# Patient Record
Sex: FEMALE | Race: ASIAN | Employment: FULL TIME | URBAN - METROPOLITAN AREA
[De-identification: names, ages, dates, MRNs, and addresses within clinical notes are randomized per-mention and may not be internally consistent; named-entity substitution may affect disease eponyms.]

---

## 2018-02-21 ENCOUNTER — OFFICE VISIT (OUTPATIENT)
Dept: INTERNAL MEDICINE CLINIC | Facility: CLINIC | Age: 48
End: 2018-02-21

## 2018-02-21 VITALS
RESPIRATION RATE: 17 BRPM | TEMPERATURE: 98 F | BODY MASS INDEX: 21.6 KG/M2 | SYSTOLIC BLOOD PRESSURE: 110 MMHG | OXYGEN SATURATION: 99 % | WEIGHT: 110 LBS | HEART RATE: 64 BPM | HEIGHT: 60 IN | DIASTOLIC BLOOD PRESSURE: 70 MMHG

## 2018-02-21 DIAGNOSIS — L40.9 PSORIASIS: Primary | ICD-10-CM

## 2018-02-21 DIAGNOSIS — Z00.00 GENERAL MEDICAL EXAM: ICD-10-CM

## 2018-02-21 LAB
ALBUMIN SERPL BCP-MCNC: 4 G/DL (ref 3.5–4.8)
ALBUMIN/GLOB SERPL: 1.4 {RATIO} (ref 1–2)
ALP SERPL-CCNC: 65 U/L (ref 32–100)
ALT SERPL-CCNC: 12 U/L (ref 14–54)
ANION GAP SERPL CALC-SCNC: 5 MMOL/L (ref 0–18)
AST SERPL-CCNC: 19 U/L (ref 15–41)
BASOPHILS # BLD: 0 K/UL (ref 0–0.2)
BASOPHILS NFR BLD: 1 %
BILIRUB SERPL-MCNC: 0.9 MG/DL (ref 0.3–1.2)
BUN SERPL-MCNC: 10 MG/DL (ref 8–20)
BUN/CREAT SERPL: 17.2 (ref 10–20)
CALCIUM SERPL-MCNC: 9.2 MG/DL (ref 8.5–10.5)
CHLORIDE SERPL-SCNC: 108 MMOL/L (ref 95–110)
CHOLEST SERPL-MCNC: 218 MG/DL (ref 110–200)
CO2 SERPL-SCNC: 28 MMOL/L (ref 22–32)
CREAT SERPL-MCNC: 0.58 MG/DL (ref 0.5–1.5)
EOSINOPHIL # BLD: 0.2 K/UL (ref 0–0.7)
EOSINOPHIL NFR BLD: 5 %
ERYTHROCYTE [DISTWIDTH] IN BLOOD BY AUTOMATED COUNT: 12.9 % (ref 11–15)
ERYTHROCYTE [SEDIMENTATION RATE] IN BLOOD: 12 MM/HR (ref 0–20)
GLOBULIN PLAS-MCNC: 2.9 G/DL (ref 2.5–3.7)
GLUCOSE SERPL-MCNC: 93 MG/DL (ref 70–99)
HCT VFR BLD AUTO: 41.2 % (ref 35–48)
HDLC SERPL-MCNC: 58 MG/DL
HGB BLD-MCNC: 13.5 G/DL (ref 12–16)
LDLC SERPL CALC-MCNC: 142 MG/DL (ref 0–99)
LYMPHOCYTES # BLD: 1.4 K/UL (ref 1–4)
LYMPHOCYTES NFR BLD: 32 %
MCH RBC QN AUTO: 30.1 PG (ref 27–32)
MCHC RBC AUTO-ENTMCNC: 32.7 G/DL (ref 32–37)
MCV RBC AUTO: 92 FL (ref 80–100)
MONOCYTES # BLD: 0.4 K/UL (ref 0–1)
MONOCYTES NFR BLD: 9 %
NEUTROPHILS # BLD AUTO: 2.4 K/UL (ref 1.8–7.7)
NEUTROPHILS NFR BLD: 53 %
NONHDLC SERPL-MCNC: 160 MG/DL
OSMOLALITY UR CALC.SUM OF ELEC: 291 MOSM/KG (ref 275–295)
PATIENT FASTING: YES
PLATELET # BLD AUTO: 198 K/UL (ref 140–400)
PMV BLD AUTO: 8.9 FL (ref 7.4–10.3)
POTASSIUM SERPL-SCNC: 4.9 MMOL/L (ref 3.3–5.1)
PROT SERPL-MCNC: 6.9 G/DL (ref 5.9–8.4)
RBC # BLD AUTO: 4.48 M/UL (ref 3.7–5.4)
SODIUM SERPL-SCNC: 141 MMOL/L (ref 136–144)
TRIGL SERPL-MCNC: 90 MG/DL (ref 1–149)
WBC # BLD AUTO: 4.5 K/UL (ref 4–11)

## 2018-02-21 PROCEDURE — 86235 NUCLEAR ANTIGEN ANTIBODY: CPT | Performed by: INTERNAL MEDICINE

## 2018-02-21 PROCEDURE — 36415 COLL VENOUS BLD VENIPUNCTURE: CPT | Performed by: INTERNAL MEDICINE

## 2018-02-21 PROCEDURE — 86039 ANTINUCLEAR ANTIBODIES (ANA): CPT | Performed by: INTERNAL MEDICINE

## 2018-02-21 PROCEDURE — 86200 CCP ANTIBODY: CPT | Performed by: INTERNAL MEDICINE

## 2018-02-21 PROCEDURE — 80053 COMPREHEN METABOLIC PANEL: CPT | Performed by: INTERNAL MEDICINE

## 2018-02-21 PROCEDURE — 86038 ANTINUCLEAR ANTIBODIES: CPT | Performed by: INTERNAL MEDICINE

## 2018-02-21 PROCEDURE — 80061 LIPID PANEL: CPT | Performed by: INTERNAL MEDICINE

## 2018-02-21 PROCEDURE — 85652 RBC SED RATE AUTOMATED: CPT | Performed by: INTERNAL MEDICINE

## 2018-02-21 PROCEDURE — 86225 DNA ANTIBODY NATIVE: CPT | Performed by: INTERNAL MEDICINE

## 2018-02-21 PROCEDURE — 85025 COMPLETE CBC W/AUTO DIFF WBC: CPT | Performed by: INTERNAL MEDICINE

## 2018-02-21 PROCEDURE — 99213 OFFICE O/P EST LOW 20 MIN: CPT | Performed by: INTERNAL MEDICINE

## 2018-02-21 NOTE — PROGRESS NOTES
HPI:    Patient ID: Song French is a 49year old female. HPI  C/o body itch with skin lesion for 3 to 4 yrs. Lived in [address] [place] [address],[address] for 10 yrs. Originally from [facility].  On and off macular rashes along her torso,  Takes cetrizine with relief.   Denies arthralgia confirmation  Continue cetrixzine  Prn. Avoid hot bath  Use unscented misturizer. Orders Placed This Encounter      Lipid Panel [E]      Comp Metabolic Panel (14) [E]      BRE, Direct, Reflex Titer + Spec AB [E]      Cyclic Citrullinate Pep.  IGG [E

## 2018-02-22 LAB — NUCLEAR IGG TITR SER IF: POSITIVE {TITER}

## 2018-02-23 LAB
ANA NUCLEOLAR TITR SER IF: 80 {TITER}
CCP IGG SERPL-ACNC: 0.5 U/ML (ref 0–6.9)
DSDNA AB TITR SER: <10 {TITER}

## 2018-02-27 LAB
ENA SM IGG SER QL: NEGATIVE
ENA SM+RNP AB SER QL: NEGATIVE
ENA SS-A AB SER QL IA: NEGATIVE
ENA SS-B AB SER QL IA: NEGATIVE

## 2018-03-14 ENCOUNTER — OFFICE VISIT (OUTPATIENT)
Dept: INTERNAL MEDICINE CLINIC | Facility: CLINIC | Age: 48
End: 2018-03-14

## 2018-03-14 VITALS
HEART RATE: 83 BPM | DIASTOLIC BLOOD PRESSURE: 70 MMHG | RESPIRATION RATE: 16 BRPM | OXYGEN SATURATION: 99 % | SYSTOLIC BLOOD PRESSURE: 110 MMHG | WEIGHT: 110 LBS | BODY MASS INDEX: 21 KG/M2 | TEMPERATURE: 98 F

## 2018-03-14 DIAGNOSIS — L40.9 PSORIASIS: Primary | ICD-10-CM

## 2018-03-14 DIAGNOSIS — Z12.31 VISIT FOR SCREENING MAMMOGRAM: ICD-10-CM

## 2018-03-14 DIAGNOSIS — R76.8 ANA POSITIVE: ICD-10-CM

## 2018-03-14 DIAGNOSIS — E78.2 MIXED HYPERLIPIDEMIA: ICD-10-CM

## 2018-03-14 PROCEDURE — 99213 OFFICE O/P EST LOW 20 MIN: CPT | Performed by: INTERNAL MEDICINE

## 2018-03-14 NOTE — PROGRESS NOTES
HPI:    Patient ID: Cisco Holder is a 49year old female. HPI   Patient here for f/u and discuss lab. Last seen for multiple scaly lesion  On the neck line, elbow. Lesion resolved with topical steroids . No arthralgia.       Component      Latest Ref R 1.0 K/UL 0.4    Eosinophils Absolute      0.0 - 0.7 K/UL 0.2    Basophils Absolute      0.0 - 0.2 K/UL 0.0    HDL Cholesterol      mg/dL 58    CHOLESTEROL, TOTAL      110 - 200 mg/dL 218 (H)    Triglycerides      1 - 149 mg/dL 90    NON HDL CHOL      <130 heart sounds. Pulmonary/Chest: Effort normal and breath sounds normal. No respiratory distress. Abdominal: Soft. Bowel sounds are normal. She exhibits no mass. Musculoskeletal: She exhibits no edema. No joint deformities.     Lymphadenopathy:     S

## 2018-03-15 PROBLEM — L40.9 PSORIASIS: Status: ACTIVE | Noted: 2018-03-15

## 2018-03-15 PROBLEM — R76.8 ANA POSITIVE: Status: ACTIVE | Noted: 2018-03-15

## 2018-03-15 PROBLEM — E78.2 MIXED HYPERLIPIDEMIA: Status: ACTIVE | Noted: 2018-03-15

## 2020-01-31 ENCOUNTER — PATIENT OUTREACH (OUTPATIENT)
Dept: INTERNAL MEDICINE CLINIC | Facility: CLINIC | Age: 50
End: 2020-01-31

## 2021-03-05 NOTE — PROGRESS NOTES
Called patient to see if she is still out patient has been almost two years since we had seen her.   Patient is still Dr Luciano Curile patient   Informed her that she is due for pap, physical mammogram   Patient stated she will call back to schedule an appointment
Statement Selected

## 2021-10-04 ENCOUNTER — OFFICE VISIT (OUTPATIENT)
Dept: FAMILY MEDICINE CLINIC | Facility: CLINIC | Age: 51
End: 2021-10-04

## 2021-10-04 ENCOUNTER — LAB ENCOUNTER (OUTPATIENT)
Dept: LAB | Age: 51
End: 2021-10-04
Attending: FAMILY MEDICINE
Payer: COMMERCIAL

## 2021-10-04 VITALS
DIASTOLIC BLOOD PRESSURE: 72 MMHG | HEART RATE: 70 BPM | HEIGHT: 60.3 IN | SYSTOLIC BLOOD PRESSURE: 120 MMHG | WEIGHT: 106 LBS | TEMPERATURE: 98 F | BODY MASS INDEX: 20.54 KG/M2

## 2021-10-04 DIAGNOSIS — E78.2 MIXED HYPERLIPIDEMIA: ICD-10-CM

## 2021-10-04 DIAGNOSIS — L50.9 URTICARIA: ICD-10-CM

## 2021-10-04 DIAGNOSIS — Z12.31 ENCOUNTER FOR SCREENING MAMMOGRAM FOR BREAST CANCER: ICD-10-CM

## 2021-10-04 DIAGNOSIS — Z00.00 WELL ADULT EXAM: Primary | ICD-10-CM

## 2021-10-04 DIAGNOSIS — L40.9 PSORIASIS: ICD-10-CM

## 2021-10-04 DIAGNOSIS — Z00.00 WELL ADULT EXAM: ICD-10-CM

## 2021-10-04 DIAGNOSIS — Z01.419 ENCOUNTER FOR GYNECOLOGICAL EXAMINATION: ICD-10-CM

## 2021-10-04 DIAGNOSIS — Z12.11 COLON CANCER SCREENING: ICD-10-CM

## 2021-10-04 PROCEDURE — 84443 ASSAY THYROID STIM HORMONE: CPT

## 2021-10-04 PROCEDURE — 85025 COMPLETE CBC W/AUTO DIFF WBC: CPT

## 2021-10-04 PROCEDURE — 99386 PREV VISIT NEW AGE 40-64: CPT | Performed by: FAMILY MEDICINE

## 2021-10-04 PROCEDURE — 90472 IMMUNIZATION ADMIN EACH ADD: CPT | Performed by: FAMILY MEDICINE

## 2021-10-04 PROCEDURE — 80061 LIPID PANEL: CPT

## 2021-10-04 PROCEDURE — 80053 COMPREHEN METABOLIC PANEL: CPT

## 2021-10-04 PROCEDURE — 90715 TDAP VACCINE 7 YRS/> IM: CPT | Performed by: FAMILY MEDICINE

## 2021-10-04 PROCEDURE — 90471 IMMUNIZATION ADMIN: CPT | Performed by: FAMILY MEDICINE

## 2021-10-04 PROCEDURE — 3008F BODY MASS INDEX DOCD: CPT | Performed by: FAMILY MEDICINE

## 2021-10-04 PROCEDURE — 36415 COLL VENOUS BLD VENIPUNCTURE: CPT

## 2021-10-04 PROCEDURE — 3074F SYST BP LT 130 MM HG: CPT | Performed by: FAMILY MEDICINE

## 2021-10-04 PROCEDURE — 3078F DIAST BP <80 MM HG: CPT | Performed by: FAMILY MEDICINE

## 2021-10-04 PROCEDURE — 90686 IIV4 VACC NO PRSV 0.5 ML IM: CPT | Performed by: FAMILY MEDICINE

## 2021-10-04 RX ORDER — LORATADINE 10 MG/1
10 TABLET ORAL DAILY
COMMUNITY

## 2021-10-04 NOTE — PROGRESS NOTES
HPI:    Patient ID: Lo Hernandez is a 45year old female.     HPI  Patient presents with:  Establish Care: has not seen a Doctor for about ten years   Routine Physical  Rash: on both arms and off for years comes back in different areas   Pap    Here with (1.532 m)   Wt 106 lb (48.1 kg)   LMP 12/17/2014   BMI 20.50 kg/m²     History reviewed. No pertinent past medical history. History reviewed. No pertinent surgical history.   Social History    Socioeconomic History      Marital status:       Spouse Physically Abused: Not on file      Sexually Abused: Not on file  Housing Stability:       Unable to Pay for Housing in the Last Year: Not on file      Number of Places Lived in the Last Year: Not on file      Unstable Housing in the Last Year: Not on file sounds. No murmur heard. Pulmonary:      Effort: Pulmonary effort is normal.      Breath sounds: Normal breath sounds. No wheezing. Chest:   Breasts: Breasts are symmetrical.      Right: Normal. No axillary adenopathy or supraclavicular adenopathy. prior to appointment     Well adult exam  (primary encounter diagnosis)  Encounter for gynecological examination  Mixed hyperlipidemia  Psoriasis  Colon cancer screening  Encounter for screening mammogram for breast cancer  Urticaria    1.  Well adult exam

## 2021-10-04 NOTE — PATIENT INSTRUCTIONS
La colonoscopia  La colonoscopia es marquis prueba que permite mirar el interior del aparato digestivo inferior (el colon y el recto). Algunas veces se puede mirar la última parte del intestino alexis llamada íleon. Jagdish la prueba, se puede extirpar Amber Melanie prueba   La prueba generalmente se realiza en el hospital mary paciente ambulatorio o en marquis clínica ambulatoria. Eso significa que puede volver a bhakta casa balbina mismo día. El procedimiento lleva aproximadamente 30 minutos.  Jagdish balbina tiempo:   · Le administ alérgica a algún alimento o medicamento. Sin embargo, puede que, en ocasiones, la causa sea desconocida. Las erupciones pueden tener un tamaño que varía de alrededor de media pulgada a varias pulgadas. Pueden aparecer en todo el cuerpo o solo en [employer]. respiratorias que van de la boca a los pulmones pueden hincharse y dificultar la respiración. Se trata marquis situación de emergencia. Utilice epinefrina, si tiene, y llame al 911o diríjase a marquis lucrecia de emergencias.    Cuándo llamar a bhakta proveedor de [employer]

## 2021-10-05 DIAGNOSIS — E78.00 HYPERCHOLESTEREMIA: ICD-10-CM

## 2021-10-05 DIAGNOSIS — R73.9 HYPERGLYCEMIA: Primary | ICD-10-CM

## 2021-11-01 ENCOUNTER — HOSPITAL ENCOUNTER (OUTPATIENT)
Dept: MAMMOGRAPHY | Facility: HOSPITAL | Age: 51
Discharge: HOME | End: 2021-11-01
Attending: FAMILY MEDICINE
Payer: COMMERCIAL

## 2021-11-01 DIAGNOSIS — Z12.31 ENCOUNTER FOR SCREENING MAMMOGRAM FOR BREAST CANCER: ICD-10-CM

## 2021-11-01 PROCEDURE — 77067 SCR MAMMO BI INCL CAD: CPT | Performed by: FAMILY MEDICINE

## 2021-11-01 PROCEDURE — 77063 BREAST TOMOSYNTHESIS BI: CPT | Performed by: FAMILY MEDICINE

## 2021-11-08 ENCOUNTER — OFFICE VISIT (OUTPATIENT)
Dept: ALLERGY | Facility: CLINIC | Age: 51
End: 2021-11-08

## 2021-11-08 ENCOUNTER — NURSE ONLY (OUTPATIENT)
Dept: ALLERGY | Facility: CLINIC | Age: 51
End: 2021-11-08

## 2021-11-08 VITALS
WEIGHT: 106 LBS | BODY MASS INDEX: 20.81 KG/M2 | DIASTOLIC BLOOD PRESSURE: 85 MMHG | SYSTOLIC BLOOD PRESSURE: 135 MMHG | OXYGEN SATURATION: 99 % | HEIGHT: 60 IN | HEART RATE: 67 BPM

## 2021-11-08 DIAGNOSIS — Z91.09 ENVIRONMENTAL ALLERGIES: ICD-10-CM

## 2021-11-08 DIAGNOSIS — L50.1 CHRONIC IDIOPATHIC URTICARIA: Primary | ICD-10-CM

## 2021-11-08 DIAGNOSIS — L50.3 DERMATOGRAPHIC URTICARIA: ICD-10-CM

## 2021-11-08 PROCEDURE — 3075F SYST BP GE 130 - 139MM HG: CPT | Performed by: ALLERGY & IMMUNOLOGY

## 2021-11-08 PROCEDURE — 99244 OFF/OP CNSLTJ NEW/EST MOD 40: CPT | Performed by: ALLERGY & IMMUNOLOGY

## 2021-11-08 PROCEDURE — 95004 PERQ TESTS W/ALRGNC XTRCS: CPT | Performed by: ALLERGY & IMMUNOLOGY

## 2021-11-08 PROCEDURE — 3008F BODY MASS INDEX DOCD: CPT | Performed by: ALLERGY & IMMUNOLOGY

## 2021-11-08 PROCEDURE — 3079F DIAST BP 80-89 MM HG: CPT | Performed by: ALLERGY & IMMUNOLOGY

## 2021-11-08 RX ORDER — LEVOCETIRIZINE DIHYDROCHLORIDE 5 MG/1
5 TABLET, FILM COATED ORAL EVERY 12 HOURS PRN
Qty: 180 TABLET | Refills: 0 | Status: SHIPPED | OUTPATIENT
Start: 2021-11-08 | End: 2021-12-06

## 2021-11-08 NOTE — PROGRESS NOTES
Debbie Garrido is a 45year old female. HPI:   Patient presents with: Allergies: patient presents possible alleriges, always itchy and when scrathes welts up and turns red,has had since 2016.     Patient is a 21-year-old female who presents for allergy Allergies      ROS:     Allergic/Immuno:  See HPI  Cardiovascular:  Negative for irregular heartbeat/palpitations, chest pain, edema  Constitutional:  Negative night sweats,weight loss, irritability and lethargy  Endocrine:  Negative for cold intolerance, urticaria    5-year history of urticaria. No prior ED visits or prednisone. No associated respiratory symptoms. Hives can be on a daily basis. Her  reports they typically round in  Shape. No prior history of asthma or seasonal allergies.   No pe

## 2021-11-08 NOTE — PATIENT INSTRUCTIONS
1.  chronic idiopathic urticaria/chronic spontaneous urticaria  Handouts on chronic urticaria provided and reviewed including  the majority being idiopathic in nature  Reviewed common triggers for hives  Reviewed symptomatic care  Start Xyzal, levocetirizin

## 2021-12-06 ENCOUNTER — OFFICE VISIT (OUTPATIENT)
Dept: ALLERGY | Facility: CLINIC | Age: 51
End: 2021-12-06

## 2021-12-06 VITALS
OXYGEN SATURATION: 97 % | DIASTOLIC BLOOD PRESSURE: 80 MMHG | TEMPERATURE: 98 F | HEART RATE: 77 BPM | WEIGHT: 106.19 LBS | SYSTOLIC BLOOD PRESSURE: 126 MMHG | HEIGHT: 60 IN | BODY MASS INDEX: 20.85 KG/M2

## 2021-12-06 DIAGNOSIS — Z23 FLU VACCINE NEED: ICD-10-CM

## 2021-12-06 DIAGNOSIS — Z92.29 COVID-19 VACCINE SERIES COMPLETED: ICD-10-CM

## 2021-12-06 DIAGNOSIS — L50.1 CHRONIC IDIOPATHIC URTICARIA: Primary | ICD-10-CM

## 2021-12-06 DIAGNOSIS — Z91.09 ENVIRONMENTAL ALLERGIES: ICD-10-CM

## 2021-12-06 DIAGNOSIS — L50.3 DERMATOGRAPHIC URTICARIA: ICD-10-CM

## 2021-12-06 PROCEDURE — 3079F DIAST BP 80-89 MM HG: CPT | Performed by: ALLERGY & IMMUNOLOGY

## 2021-12-06 PROCEDURE — 3074F SYST BP LT 130 MM HG: CPT | Performed by: ALLERGY & IMMUNOLOGY

## 2021-12-06 PROCEDURE — 3008F BODY MASS INDEX DOCD: CPT | Performed by: ALLERGY & IMMUNOLOGY

## 2021-12-06 PROCEDURE — 99213 OFFICE O/P EST LOW 20 MIN: CPT | Performed by: ALLERGY & IMMUNOLOGY

## 2021-12-06 RX ORDER — LEVOCETIRIZINE DIHYDROCHLORIDE 5 MG/1
5 TABLET, FILM COATED ORAL EVERY 12 HOURS PRN
Qty: 180 TABLET | Refills: 1 | Status: SHIPPED | OUTPATIENT
Start: 2021-12-06

## 2021-12-06 NOTE — PROGRESS NOTES
Malgorzata Barrera is a 45year old female. HPI:   Patient presents with: Follow - Up: 45year old female is here today 1mth f/u on Allergies. Patient denies any congestion, SOB, or nasal drip. Patient states she feels better since her last visit.      Lidia 1       Allergies:  No Known Allergies      ROS:   Allergic/Immuno:  See hpi  Cardiovascular:  Negative for irregular heartbeat/palpitations, chest pain, edema  Constitutional:  Negative night sweats,weight loss, irritability and lethargy  ENMT:  Negative sprays per nostril once a day if having prominent nasal congestion postnasal drip    3. Covid vaccination up-to-date x2 doses. Recommend booster once indicated      4.   Flu vaccine up-to-date    Follow-up in 1 year or sooner if needed         Orders This

## 2021-12-06 NOTE — PATIENT INSTRUCTIONS
#1 chronic idiopathic urticaria with dermatographia component  Improved with Xyzal 5 mg once a day. No ED visits or prednisone in the interim.   May increase Xyzal up to 4 times per day if needed  Consider Xolair if refractory  Recommend daily moisturizer

## 2022-03-29 ENCOUNTER — TELEPHONE (OUTPATIENT)
Dept: CASE MANAGEMENT | Age: 52
End: 2022-03-29

## 2022-03-29 NOTE — TELEPHONE ENCOUNTER
Patient is due for colorectal screening. Dr Donna Ramirez generated GI referral 10/4/21. Unable to leave message. Voicemail box is not set up.